# Patient Record
Sex: MALE | Race: BLACK OR AFRICAN AMERICAN
[De-identification: names, ages, dates, MRNs, and addresses within clinical notes are randomized per-mention and may not be internally consistent; named-entity substitution may affect disease eponyms.]

---

## 2024-05-13 ENCOUNTER — APPOINTMENT (OUTPATIENT)
Dept: OTOLARYNGOLOGY | Facility: CLINIC | Age: 44
End: 2024-05-13
Payer: COMMERCIAL

## 2024-05-13 ENCOUNTER — NON-APPOINTMENT (OUTPATIENT)
Age: 44
End: 2024-05-13

## 2024-05-13 VITALS
DIASTOLIC BLOOD PRESSURE: 86 MMHG | WEIGHT: 215 LBS | HEIGHT: 70 IN | OXYGEN SATURATION: 98 % | BODY MASS INDEX: 30.78 KG/M2 | HEART RATE: 81 BPM | SYSTOLIC BLOOD PRESSURE: 148 MMHG | RESPIRATION RATE: 16 BRPM

## 2024-05-13 DIAGNOSIS — I10 ESSENTIAL (PRIMARY) HYPERTENSION: ICD-10-CM

## 2024-05-13 DIAGNOSIS — E78.5 HYPERLIPIDEMIA, UNSPECIFIED: ICD-10-CM

## 2024-05-13 PROBLEM — Z00.00 ENCOUNTER FOR PREVENTIVE HEALTH EXAMINATION: Status: ACTIVE | Noted: 2024-05-13

## 2024-05-13 PROCEDURE — 99203 OFFICE O/P NEW LOW 30 MIN: CPT

## 2024-05-13 RX ORDER — LOSARTAN POTASSIUM AND HYDROCHLOROTHIAZIDE 12.5; 5 MG/1; MG/1
50-12.5 TABLET ORAL
Refills: 0 | Status: ACTIVE | COMMUNITY

## 2024-05-13 RX ORDER — AMLODIPINE BESYLATE 10 MG/1
10 TABLET ORAL
Refills: 0 | Status: ACTIVE | COMMUNITY

## 2024-05-13 RX ORDER — ROSUVASTATIN CALCIUM 5 MG/1
5 TABLET, FILM COATED ORAL
Refills: 0 | Status: ACTIVE | COMMUNITY

## 2024-06-03 NOTE — HISTORY OF PRESENT ILLNESS
[de-identified] : 42 y/o M presents to the office with C/O snoring and high BP. Pt states he had completed a Sleep Study requested by PCP on 2/7/24. 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 Pt is currently on amlodipine and losartan to control BP. Pt reports a mild stuffy nose and breathe through mouth at night. Pt also reports low energy and wakes up tired.  Mild PJ and HBP laud snoring

## 2024-06-03 NOTE — PHYSICAL EXAM
[Normal] : tympanic membranes are normal in both ears [] : septum deviated to the left [de-identified] : turbinate hypertrophy  [de-identified] : 2+ tonsils retroplatal obstr  2+ tonsils 2+ tonsils  2+ tonsils

## 2024-06-03 NOTE — REASON FOR VISIT
[Sleep Apnea/ Snoring] : sleep apnea/ snoring [Initial Evaluation] : an initial evaluation for [FreeTextEntry2] : Mild PJ and HBP

## 2024-06-03 NOTE — CONSULT LETTER
[Dear  ___] : Dear  [unfilled], [Consult Letter:] : I had the pleasure of evaluating your patient, [unfilled]. [Please see my note below.] : Please see my note below. [Consult Closing:] : Thank you very much for allowing me to participate in the care of this patient.  If you have any questions, please do not hesitate to contact me. [Sincerely,] : Sincerely, [FreeTextEntry3] : Agusto Rodrigues MD, FACS Professor of Otolaryngology, [facility] [employer] at [facility] Director, [facility], Department of Otolaryngology, [facility] , [facility], [facility]

## 2024-07-24 ENCOUNTER — APPOINTMENT (OUTPATIENT)
Dept: OTOLARYNGOLOGY | Facility: CLINIC | Age: 44
End: 2024-07-24

## 2024-08-21 ENCOUNTER — APPOINTMENT (OUTPATIENT)
Dept: OTOLARYNGOLOGY | Facility: CLINIC | Age: 44
End: 2024-08-21
Payer: COMMERCIAL

## 2024-08-21 VITALS
OXYGEN SATURATION: 96 % | SYSTOLIC BLOOD PRESSURE: 128 MMHG | BODY MASS INDEX: 31.5 KG/M2 | DIASTOLIC BLOOD PRESSURE: 76 MMHG | HEIGHT: 70 IN | HEART RATE: 75 BPM | WEIGHT: 220 LBS

## 2024-08-21 DIAGNOSIS — G89.18 OTHER ACUTE POSTPROCEDURAL PAIN: ICD-10-CM

## 2024-08-21 DIAGNOSIS — G47.33 OBSTRUCTIVE SLEEP APNEA (ADULT) (PEDIATRIC): ICD-10-CM

## 2024-08-21 PROCEDURE — 30117 REMOVAL OF INTRANASAL LESION: CPT | Mod: LT

## 2024-08-21 PROCEDURE — 30469Z: CUSTOM

## 2024-08-21 PROCEDURE — 30802 ABLATE INF TURBINATE SUBMUC: CPT

## 2024-08-21 PROCEDURE — 42140 EXCISION OF UVULA: CPT

## 2024-08-21 RX ORDER — ACETAMINOPHEN AND CODEINE PHOSPHATE 300; 30 MG/1; MG/1
300-30 TABLET ORAL 3 TIMES DAILY
Qty: 8 | Refills: 0 | Status: ACTIVE | COMMUNITY
Start: 2024-08-21 | End: 1900-01-01

## 2024-08-21 RX ORDER — LIDOCAINE HYDROCHLORIDE 20 MG/ML
2 SOLUTION ORAL; TOPICAL
Qty: 1 | Refills: 2 | Status: ACTIVE | COMMUNITY
Start: 2024-08-21 | End: 1900-01-01

## 2024-08-21 RX ORDER — METHYLPREDNISOLONE 4 MG/1
4 TABLET ORAL
Qty: 1 | Refills: 0 | Status: ACTIVE | COMMUNITY
Start: 2024-08-21 | End: 1900-01-01

## 2024-08-21 NOTE — ASSESSMENT
[FreeTextEntry1] : 1. mild jose -L turb reduction, l nasal valve repair, uvuloplasty performed in office with no complication -post op care discussed, worksheet provided -lidocaine viscous, medrol dose tennille, tylenol with codeine (patient does not tolerate NSAIDs) -RTC in x3 weeks for post op check

## 2024-08-21 NOTE — PHYSICAL EXAM
[de-identified] : L nasal valve obstruction, L turbinate hypertrophy [de-identified] : low soft palate [Normal] : no rashes

## 2024-08-21 NOTE — REASON FOR VISIT
[Procedure Visit: _____] : [unfilled]  [FreeTextEntry2] : uvuloplasty, L turb reduction, L nasal valve repair

## 2024-08-21 NOTE — HISTORY OF PRESENT ILLNESS
[de-identified] : 3 month follow up for this 42 y/o male with h/o mild PJ and HTN here for laser uvuloplasty, L turb reduction, L nasal valve repair for tx of mild PJ.

## 2024-08-21 NOTE — PHYSICAL EXAM
[de-identified] : L nasal valve obstruction, L turbinate hypertrophy [de-identified] : low soft palate [Normal] : no rashes

## 2024-08-21 NOTE — PROCEDURE
[FreeTextEntry3] : preoperative diagnosis: mild PJ postoperative diagnosis: mild PJ Surgeon: Agusto Rodrigues MD Procedure note: After informed verbal consent is obtained, topical vasoconstrictor and anesthetic was applied to B/L nasal cavities. L Laser turbinate reduction, L nasal valve repair, uvuloplasty was performed using diode laser. The soft palate was raised by creating incision with diode laser. The L nasal cavity was dressed with bacitracin ointment with no complication. Patient was stable and well during and after procedure. Patient understood all post-op education material provided.

## 2024-09-12 ENCOUNTER — APPOINTMENT (OUTPATIENT)
Dept: OTOLARYNGOLOGY | Facility: CLINIC | Age: 44
End: 2024-09-12
Payer: COMMERCIAL

## 2024-09-12 VITALS
OXYGEN SATURATION: 95 % | WEIGHT: 220 LBS | BODY MASS INDEX: 31.5 KG/M2 | DIASTOLIC BLOOD PRESSURE: 71 MMHG | HEIGHT: 70 IN | HEART RATE: 86 BPM | SYSTOLIC BLOOD PRESSURE: 118 MMHG

## 2024-09-12 DIAGNOSIS — Z09 ENCOUNTER FOR FOLLOW-UP EXAMINATION AFTER COMPLETED TREATMENT FOR CONDITIONS OTHER THAN MALIGNANT NEOPLASM: ICD-10-CM

## 2024-09-12 PROCEDURE — 99212 OFFICE O/P EST SF 10 MIN: CPT | Mod: 24

## 2024-09-12 NOTE — PHYSICAL EXAM
[Midline] : trachea located in midline position [Normal] : assessment of respiratory effort is normal

## 2024-09-12 NOTE — REASON FOR VISIT
[Post-Operative Visit] : a post-operative visit [FreeTextEntry2] : 3 week post op laser assisted uvuloplasty, L turb reduction, L nasal valve repair

## 2024-09-12 NOTE — HISTORY OF PRESENT ILLNESS
[de-identified] : 3 week post op visit for this 42 y/o M s/p laser assisted uvuloplasty, L turb reduction, L nasal valve repair. Patient unsure if he is still snoring, states that he no longer exhibits apneic episodes at night. Patient states nasal congestion improved. Patient denies throat pain, nasal congestion.